# Patient Record
Sex: MALE | Race: WHITE | NOT HISPANIC OR LATINO | Employment: OTHER | URBAN - NONMETROPOLITAN AREA
[De-identification: names, ages, dates, MRNs, and addresses within clinical notes are randomized per-mention and may not be internally consistent; named-entity substitution may affect disease eponyms.]

---

## 2019-11-20 ENCOUNTER — TRANSCRIBE ORDERS (OUTPATIENT)
Dept: PHYSICAL THERAPY | Facility: CLINIC | Age: 84
End: 2019-11-20

## 2019-11-21 ENCOUNTER — TRANSCRIBE ORDERS (OUTPATIENT)
Dept: PHYSICAL THERAPY | Facility: CLINIC | Age: 84
End: 2019-11-21

## 2019-11-21 ENCOUNTER — EVALUATION (OUTPATIENT)
Dept: PHYSICAL THERAPY | Facility: CLINIC | Age: 84
End: 2019-11-21
Payer: MEDICARE

## 2019-11-21 VITALS — HEART RATE: 52 BPM | SYSTOLIC BLOOD PRESSURE: 118 MMHG | DIASTOLIC BLOOD PRESSURE: 78 MMHG

## 2019-11-21 DIAGNOSIS — M54.50 LEFT-SIDED LOW BACK PAIN WITHOUT SCIATICA, UNSPECIFIED CHRONICITY: ICD-10-CM

## 2019-11-21 DIAGNOSIS — M25.552 LEFT HIP PAIN: Primary | ICD-10-CM

## 2019-11-21 PROCEDURE — 97110 THERAPEUTIC EXERCISES: CPT

## 2019-11-21 PROCEDURE — 97162 PT EVAL MOD COMPLEX 30 MIN: CPT

## 2019-11-21 NOTE — PROGRESS NOTES
PT Evaluation     Today's date: 2019  Patient name: Yesy Dawn  : 1933  MRN: [id number]  Referring provider: Haja Avalos MD  Dx:   Encounter Diagnosis     ICD-10-CM    1  Left hip pain M25 552    2  Left-sided low back pain without sciatica, unspecified chronicity M54 5                   Assessment  Assessment details: Pt is 80 y o  Male referred to physical therapy by Dr Crispin Stewart with diagnosis of left hip pain and low back pain without sciatica  Patient seen this date for initial PT evaluation  Received orders for PT eval and tx, Comorbidities affecting pt's physical performance at time of assessment include: s/p CABG x 3, kidney disease, mitral regurgitation, RBBB  Prior level of function, pt was independent without previous history of hip pain  Lives alone, provides own self care  Please find objective findings from PT evaluation outlined below, with impairments and limitations including: 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 BPM but asymptomatic  Patient also report one month ago he had an episode where he felt was unable to call the dog and was unable to speak and was drooling  States he did not seek medical attention at that time  Educated in signs and symptoms of stroke and need for medical evaluation if these symptoms shoulder occur  Also has weakness of hip musculature and significant thoracic kyphosis  Pt's clinical presentation is currently unstable due to multiple body systems affected  Pt to benefit from continued PT tx to address deficits as defined above and maximize level of functional independent mobility in order to facilitate return to prior level of function    Impairments: abnormal or restricted ROM, activity intolerance, impaired physical strength, lacks appropriate home exercise program, pain with function and poor posture     Symptom irritability: lowBarriers to therapy: none  Understanding of Dx/Px/POC: good   Prognosis: good    Goals  Short term goals: 2-4 weeks  Patient will report pain level of 2-3/10 with functional activities  Patient will ambulate without hip pain in left hip functional distances  Patient will arise from bed and initiate ambulation without hip pain  Patient will gain ROM of left hip to functional flexion and rotation  Patient will increase strength to 4+/ in hip and knee musculature  Patient will increase core muscle strength to 3+/5  Long term goals:4-8 weeks  Patient will report pain level of 0-1/10 with functional activities and gait  Patient will demonstate independence in home exercise program     Plan  Referral necessary: No  Planned modality interventions: ultrasound and thermotherapy: hydrocollator packs  Planned therapy interventions: manual therapy, patient education, strengthening, therapeutic exercise and home exercise program  Frequency: 2x week  Duration in visits: 10  Duration in weeks: 8  Plan of Care beginning date: 2019  Plan of Care expiration date: 2020  Treatment plan discussed with: patient        Subjective Evaluation    History of Present Illness  Date of onset: 2019  Mechanism of injury: Pain began upon awake  Improving except painful when walkingning in left hip  Increased pain with WB            Not a recurrent problem   Quality of life: good    Pain  Current pain ratin  At best pain ratin  At worst pain ratin  Quality: dull ache  Progression: improved    Social Support  Steps to enter house: no  Stairs in house: no   Lives in: [facility]  Lives with: alone    Employment status: not working  Treatments  Current treatment: physical therapy  Patient Goals  Patient goals for therapy: decreased pain, increased motion, increased strength and independence with ADLs/IADLs  Patient goal: to be painfree when I walk        Objective     Palpation     Additional Palpation Details  No tenderness to palpation of lumbar musculature, left hip, left bursa or ITB    Lumbar Screen  Lumbar range of motion within normal limits with the following exceptions:Slight soreness at end range flexion but full active range  Extension 5 degrees    Posture kyphotic in thoracic spine    Neurological Testing     Sensation     Hip   Left Hip   Intact: light touch    Right Hip   Intact: light touch    Active Range of Motion   Left Hip   Flexion: 100 degrees   Extension: 5 degrees   Abduction: WFL  Adduction: WFL  External rotation (90/90): 45 degrees   Internal rotation (90/90): 35 degrees     Strength/Myotome Testing     Left Hip   Planes of Motion   Flexion: 3  Extension: 4-  Abduction: 4-  Adduction: 4  External rotation: 3+  Internal rotation: 3+      Medications reviewed with patient as per patient list       Precautions: a fib, Low heart rate        Exercise Diary  11/21       Visit # 1       Pain level 0/10       Add sets x15       Clamshell oranges supine X 15       SKTC x5       SLR        bridges                                                                                        Modalities         Hp hip/ back 10 min

## 2019-11-21 NOTE — LETTER
2019    Johana Berry, [address] [facility] [address]    Patient: Jimmie Bowling   YOB: 1933   Date of Visit: 2019     Encounter Diagnosis     ICD-10-CM    1  Left hip pain M25 552    2  Left-sided low back pain without sciatica, unspecified chronicity M54 5        Dear Dr Brandy Saravia: Thank you for your recent referral of Jimmie Bowling  Please review the attached evaluation summary from Andre's recent visit  Please verify that you agree with the plan of care by signing the attached order  If you have any questions or concerns, please do not hesitate to call  I sincerely appreciate the opportunity to share in the care of one of your patients and hope to have another opportunity to work with you in the near future  Sincerely,    Rodo Vleazquez, PT      Referring Provider:      I certify that I have read the below Plan of Care and certify the need for these services furnished under this plan of treatment while under my care  Johana Berry MD  [address]: [phone]          PT Evaluation     Today's date: 2019  Patient name: Jimmie Bowling  : 1933  MRN: [id number]  Referring provider: Abiodun Stevens MD  Dx:   Encounter Diagnosis     ICD-10-CM    1  Left hip pain M25 552    2  Left-sided low back pain without sciatica, unspecified chronicity M54 5                   Assessment  Assessment details: Pt is 80 y o  Male referred to physical therapy by Dr Greyson Choi with diagnosis of left hip pain and low back pain without sciatica  Patient seen this date for initial PT evaluation  Received orders for PT eval and tx, Comorbidities affecting pt's physical performance at time of assessment include: s/p CABG x 3, kidney disease, mitral regurgitation, RBBB  Prior level of function, pt was independent without previous history of hip pain    Lives alone, provides own self care   Please find objective findings from PT evaluation outlined below, with impairments and limitations including: 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 BPM but asymptomatic  Patient also report one month ago he had an episode where he felt was unable to call the dog and was unable to speak and was drooling  States he did not seek medical attention at that time  Educated in signs and symptoms of stroke and need for medical evaluation if these symptoms shoulder occur  Also has weakness of hip musculature and significant thoracic kyphosis  Pt's clinical presentation is currently unstable due to multiple body systems affected  Pt to benefit from continued PT tx to address deficits as defined above and maximize level of functional independent mobility in order to facilitate return to prior level of function    Impairments: abnormal or restricted ROM, activity intolerance, impaired physical strength, lacks appropriate home exercise program, pain with function and poor posture     Symptom irritability: lowBarriers to therapy: none  Understanding of Dx/Px/POC: good   Prognosis: good    Goals  Short term goals:  2-4 weeks  Patient will report pain level of 2-3/10 with functional activities  Patient will ambulate without hip pain in left hip functional distances  Patient will arise from bed and initiate ambulation without hip pain  Patient will gain ROM of left hip to functional flexion and rotation  Patient will increase strength to 4+/ in hip and knee musculature  Patient will increase core muscle strength to 3+/5  Long term goals:4-8 weeks  Patient will report pain level of 0-1/10 with functional activities and gait  Patient will demonstate independence in home exercise program     Plan  Referral necessary: No  Planned modality interventions: ultrasound and thermotherapy: hydrocollator packs  Planned therapy interventions: manual therapy, patient education, strengthening, therapeutic exercise and home exercise program  Frequency: 2x week  Duration in visits: 10  Duration in weeks: 8  Plan of Care beginning date: 2019  Plan of Care expiration date: 2020  Treatment plan discussed with: patient        Subjective Evaluation    History of Present Illness  Date of onset: 2019  Mechanism of injury: Pain began upon awake  Improving except painful when walkingning in left hip  Increased pain with WB  Not a recurrent problem   Quality of life: good    Pain  Current pain ratin  At best pain ratin  At worst pain ratin  Quality: dull ache  Progression: improved    Social Support  Steps to enter house: no  Stairs in house: no   Lives in: [place] with: alone    Employment status: not working  Treatments  Current treatment: physical therapy  Patient Goals  Patient goals for therapy: decreased pain, increased motion, increased strength and independence with ADLs/IADLs  Patient goal: to be painfree when I walk        Objective     Palpation     Additional Palpation Details  No tenderness to palpation of lumbar musculature, left hip, left bursa or ITB    Lumbar Screen  Lumbar range of motion within normal limits with the following exceptions:Slight soreness at end range flexion but full active range  Extension 5 degrees    Posture kyphotic in thoracic spine    Neurological Testing     Sensation     Hip   Left Hip   Intact: light touch    Right Hip   Intact: light touch    Active Range of Motion   Left Hip   Flexion: 100 degrees   Extension: 5 degrees   Abduction: WFL  Adduction: WFL  External rotation (90/90): 45 degrees   Internal rotation (90/90): 35 degrees     Strength/Myotome Testing     Left Hip   Planes of Motion   Flexion: 3  Extension: 4-  Abduction: 4-  Adduction: 4  External rotation: 3+  Internal rotation: 3+      Medications reviewed with patient as per patient list       Precautions: a fib, Low heart rate        Exercise Diary         Visit # 1       Pain level 0/10 Add sets x15       Clamshell oranges supine X 15       SKTC x5       SLR        bridges                                                                                        Modalities         Hp hip/ back 10 min

## 2019-11-25 ENCOUNTER — OFFICE VISIT (OUTPATIENT)
Dept: PHYSICAL THERAPY | Facility: CLINIC | Age: 84
End: 2019-11-25
Payer: MEDICARE

## 2019-11-25 DIAGNOSIS — M54.50 LEFT-SIDED LOW BACK PAIN WITHOUT SCIATICA, UNSPECIFIED CHRONICITY: ICD-10-CM

## 2019-11-25 DIAGNOSIS — M25.552 LEFT HIP PAIN: Primary | ICD-10-CM

## 2019-11-25 PROCEDURE — 97110 THERAPEUTIC EXERCISES: CPT

## 2019-11-25 PROCEDURE — 97010 HOT OR COLD PACKS THERAPY: CPT

## 2019-11-25 PROCEDURE — 97140 MANUAL THERAPY 1/> REGIONS: CPT

## 2019-11-25 NOTE — PROGRESS NOTES
Daily Note     Today's date: 2019  Patient name: Arin Jc  : 1933  MRN: [id number]  Referring provider: John Jacobs MD  Dx:   Encounter Diagnosis     ICD-10-CM    1  Left hip pain M25 552    2  Left-sided low back pain without sciatica, unspecified chronicity M54 5        Start Time: 1400  Stop Time: 1440  Total time in clinic (min): 40 minutes    Subjective: Pt comments on min LBP today, absent N/T  Objective: See treatment diary below  Included LE stretch; gastroc, hamstring,opp k-c,piriformis  Assessment: Tolerated treatment well  Patient exhibited good technique with therapeutic exercises      Plan: Continue per plan of care        Precautions: a fib, Low heart rate        Exercise Diary        Visit # 1 2      Pain level 0/10 0/10      Add sets x15 2/10      HIP ABD supine X 15 L2   2/10      SKTC x5 5x 10"      SLR        bridges  ----->      PPT  10x                                                              LE Stretch  Ds 10m              Modalities         Hp hip/ back seated 10 min 12m

## 2019-11-27 ENCOUNTER — OFFICE VISIT (OUTPATIENT)
Dept: PHYSICAL THERAPY | Facility: CLINIC | Age: 84
End: 2019-11-27
Payer: MEDICARE

## 2019-11-27 DIAGNOSIS — M54.50 LEFT-SIDED LOW BACK PAIN WITHOUT SCIATICA, UNSPECIFIED CHRONICITY: ICD-10-CM

## 2019-11-27 DIAGNOSIS — M25.552 LEFT HIP PAIN: Primary | ICD-10-CM

## 2019-11-27 PROCEDURE — 97140 MANUAL THERAPY 1/> REGIONS: CPT

## 2019-11-27 PROCEDURE — 97010 HOT OR COLD PACKS THERAPY: CPT

## 2019-11-27 PROCEDURE — 97110 THERAPEUTIC EXERCISES: CPT

## 2019-11-27 NOTE — PROGRESS NOTES
Daily Note     Today's date: 2019  Patient name: Kymberly Anderson  : 1933  MRN: [id number]  Referring provider: Drew Brenner MD  Dx:   Encounter Diagnosis     ICD-10-CM    1  Left hip pain M25 552    2  Left-sided low back pain without sciatica, unspecified chronicity M54 5        Start Time: 1400  Stop Time: 1452  Total time in clinic (min): 52 minutes    Subjective: Pt notes mild persisting LBP  Objective: See treatment diary below  Added rotations c pelvic tilts  Assessment: Tolerated treatment well  Patient exhibited good technique with therapeutic exercises      Plan: Continue per plan of care        Precautions: a fib, Low heart rate        Exercise Diary       Visit # 1 2 3     Pain level 0/10 0/10 0/10     Add sets x15 2/10 Ball 3/10     HIP ABD supine X 15 L2   2/10 L3  3/10     SKTC x5 5x 10" home     SLR   ----->     bridges  -----> 10x     PPT  10x 20x     LTR c ppt   /                                                     LE Stretch  Ds 10m 10m ds             Modalities         Hp hip/ back seated 10 min 12m 12m

## 2019-12-02 ENCOUNTER — OFFICE VISIT (OUTPATIENT)
Dept: PHYSICAL THERAPY | Facility: CLINIC | Age: 84
End: 2019-12-02
Payer: MEDICARE

## 2019-12-02 DIAGNOSIS — M25.552 LEFT HIP PAIN: Primary | ICD-10-CM

## 2019-12-02 DIAGNOSIS — M54.50 LEFT-SIDED LOW BACK PAIN WITHOUT SCIATICA, UNSPECIFIED CHRONICITY: ICD-10-CM

## 2019-12-02 PROCEDURE — 97110 THERAPEUTIC EXERCISES: CPT

## 2019-12-02 PROCEDURE — 97010 HOT OR COLD PACKS THERAPY: CPT

## 2019-12-02 PROCEDURE — 97140 MANUAL THERAPY 1/> REGIONS: CPT

## 2019-12-02 NOTE — PROGRESS NOTES
Daily Note     Today's date: 2019  Patient name: Keshia Dow  : 1933  MRN: [id number]  Referring provider: Nena Zazueta MD  Dx:   Encounter Diagnosis     ICD-10-CM    1  Left hip pain M25 552    2  Left-sided low back pain without sciatica, unspecified chronicity M54 5        Start Time: 1345  Stop Time: 1435  Total time in clinic (min): 50 minutes    Subjective: Patient stated most discomfort in LB this session, rating his pain 5/10  Objective: PTA directed patient in TE program, adding SLR, See treatment diary below  Assessment: Tolerated treatment well  Patient exhibited good technique with therapeutic exercises, no increase in sx in LB, including SLR  Plan: Continue per plan of care        Precautions: a fib, Low heart rate        Exercise Diary      Visit # 1 2 3 4     Pain level 0/10 0/10 0/10 5/10    Add sets x15 2/10 [facility] 3/10 Ball 3/10    HIP ABD supine X 15 L2   2/10 L3  3/10 L3 3/10    SKTC x5 5x 10" home 2/10    SLR   -----> 2/10    bridges  -----> 10x 10x     PPT  10x 20x 20x     LTR c ppt                                                       LE Stretch  Ds 10m 10m ds 10m KW             Modalities         Hp hip/ back seated 10 min 12m 12m 10m

## 2019-12-04 ENCOUNTER — TRANSCRIBE ORDERS (OUTPATIENT)
Dept: PHYSICAL THERAPY | Facility: CLINIC | Age: 84
End: 2019-12-04

## 2019-12-04 ENCOUNTER — OFFICE VISIT (OUTPATIENT)
Dept: PHYSICAL THERAPY | Facility: CLINIC | Age: 84
End: 2019-12-04
Payer: MEDICARE

## 2019-12-04 DIAGNOSIS — M25.552 LEFT HIP PAIN: Primary | ICD-10-CM

## 2019-12-04 DIAGNOSIS — M54.50 LEFT-SIDED LOW BACK PAIN WITHOUT SCIATICA, UNSPECIFIED CHRONICITY: ICD-10-CM

## 2019-12-04 PROCEDURE — 97110 THERAPEUTIC EXERCISES: CPT | Performed by: PHYSICAL THERAPIST

## 2019-12-04 PROCEDURE — 97010 HOT OR COLD PACKS THERAPY: CPT | Performed by: PHYSICAL THERAPIST

## 2019-12-04 PROCEDURE — 97140 MANUAL THERAPY 1/> REGIONS: CPT | Performed by: PHYSICAL THERAPIST

## 2019-12-04 NOTE — PROGRESS NOTES
PT Discharge Summary    Today's date: 2019  Patient name: Anup Morejon  : 1933  MRN: [id number]  Referring provider: Rocky Buck MD  Dx:   Encounter Diagnosis     ICD-10-CM    1  Left hip pain M25 552    2  Left-sided low back pain without sciatica, unspecified chronicity M54 5                Assessment  Assessment details: Pt is 80 y o  Male referred to physical therapy by Dr Saul Irwin with diagnosis of left hip pain and low back pain without sciatica  Pt was seen for 5 visits of therapy with resolution of symptoms and no pain with ambulation  Pt has demonstrated improvements in L hip ROM and strength meeting nearly all goals  Pt feels he no longer requires therapy and will do his exercises at home and is requesting discharge to home program  Pt's goal of ambulation without hip pain has been met  Goals  Short term goals:  2-4 weeks  Patient will report pain level of 2-3/10 with functional activities- met  Patient will ambulate without hip pain in left hip functional distances- met  Patient will arise from bed and initiate ambulation without hip pain- met  Patient will gain ROM of left hip to functional flexion and rotation - met  Patient will increase strength to 4+/ in hip and knee musculature -met  Patient will increase core muscle strength to 3+/5- met  Long term goals:4-8 weeks  Patient will report pain level of 0-1/10 with functional activities and gait- met  Patient will demonstate independence in home exercise program  - met    Plan  D/C pt to [facility]        Subjective Evaluation    History of Present Illness  Date of onset: 2019  Mechanism of injury: Pt feels he is better and would like to be discharged to [facility]         Pain  Current pain ratin  At best pain ratin  At worst pain ratin  Quality: dull ache  Progression: resolved      Patient Goals  Patient goals for therapy: decreased pain, increased motion, increased strength and independence with ADLs/IADLs  Patient goal: to be painfree when I walk-met        Objective     Palpation     Additional Palpation Details  No tenderness to palpation of lumbar musculature, left hip, left bursa or ITB      Neurological Testing     Sensation     Hip   Left Hip   Intact: light touch    Right Hip   Intact: light touch    Active Range of Motion   Left Hip   Flexion: 110 degrees   Extension: 10 degrees   Abduction: WFL  Adduction: WFL  External rotation (90/90): 45 degrees   Internal rotation (90/90): 35 degrees     Strength/Myotome Testing     Left Hip   Planes of Motion   Flexion: 4+  Extension: 4+  Abduction: 4+  Adduction: 4+  External rotation: 4  Internal rotation: 4      Subjective: Pt states he feels good and his goals are met  Pt would like this to be his last visit and states he will do his exercises at home  Objective: See treatment diary below      Assessment: Tolerated treatment well  Patient exhibited good technique with therapeutic exercises  See above for discharge summary and assessment of goals        Plan: D/C Pt from PT to [facility]     Precautions: a fib, Low heart rate        Exercise Diary  11/21 11/25 11/27 12/2 12/4   Visit # 1 2 3 4  5   Pain level 0/10 0/10 0/10 5/10 0/10   Add sets x15 2/10 [facility] [place] [place] 3/10 Ball 3/10 3/10   HIP ABD supine X 15 L2   2/10 L3  3/10 L3 3/10 L3 3/10   SKTC x5 5x 10" home 2/10 2/10   SLR   -----> 2/10 2/10   bridges  -----> 10x 10x  x20   PPT  10x 20x 20x  x20   LTR c ppt   1/20 2/20 2/20                                                   LE Stretch  Ds 10m 10m ds 10m KW  15   JH           Modalities         Hp hip/ back seated 10 min 12m 12m 10m  15 min

## 2020-11-12 ENCOUNTER — APPOINTMENT (RX ONLY)
Dept: URBAN - NONMETROPOLITAN AREA CLINIC 4 | Facility: CLINIC | Age: 85
Setting detail: DERMATOLOGY
End: 2020-11-12

## 2020-11-12 DIAGNOSIS — L30.0 NUMMULAR DERMATITIS: ICD-10-CM

## 2020-11-12 PROCEDURE — ? COUNSELING

## 2020-11-12 PROCEDURE — 99202 OFFICE O/P NEW SF 15 MIN: CPT

## 2020-11-12 PROCEDURE — ? PRESCRIPTION

## 2020-11-12 PROCEDURE — ? PRESCRIPTION MEDICATION MANAGEMENT

## 2020-11-12 RX ORDER — TRIAMCINOLONE ACETONIDE 1 MG/G
CREAM TOPICAL BID
Qty: 1 | Refills: 2 | Status: ERX | COMMUNITY
Start: 2020-11-12

## 2020-11-12 RX ADMIN — TRIAMCINOLONE ACETONIDE: 1 CREAM TOPICAL at 00:00

## 2020-11-12 ASSESSMENT — LOCATION SIMPLE DESCRIPTION DERM: LOCATION SIMPLE: RIGHT UPPER BACK

## 2020-11-12 ASSESSMENT — SEVERITY ASSESSMENT: SEVERITY: MILD

## 2020-11-12 ASSESSMENT — LOCATION ZONE DERM: LOCATION ZONE: TRUNK

## 2020-11-12 ASSESSMENT — LOCATION DETAILED DESCRIPTION DERM: LOCATION DETAILED: RIGHT MEDIAL UPPER BACK

## 2020-11-12 NOTE — PROCEDURE: PRESCRIPTION MEDICATION MANAGEMENT
Detail Level: Zone
Initiate Treatment: triamcinolone Cream BID to AA on body, Dove soap
Render In Strict Bullet Format?: No